# Patient Record
Sex: FEMALE | Race: WHITE | NOT HISPANIC OR LATINO | URBAN - METROPOLITAN AREA
[De-identification: names, ages, dates, MRNs, and addresses within clinical notes are randomized per-mention and may not be internally consistent; named-entity substitution may affect disease eponyms.]

---

## 2020-05-11 ENCOUNTER — OFFICE (OUTPATIENT)
Dept: URBAN - METROPOLITAN AREA CLINIC 11 | Facility: CLINIC | Age: 25
End: 2020-05-11

## 2020-05-11 VITALS
HEART RATE: 76 BPM | WEIGHT: 156 LBS | HEIGHT: 68 IN | SYSTOLIC BLOOD PRESSURE: 150 MMHG | DIASTOLIC BLOOD PRESSURE: 89 MMHG

## 2020-05-11 DIAGNOSIS — R11.0 NAUSEA: ICD-10-CM

## 2020-05-11 DIAGNOSIS — R53.83 OTHER FATIGUE: ICD-10-CM

## 2020-05-11 DIAGNOSIS — N92.0 EXCESSIVE AND FREQUENT MENSTRUATION WITH REGULAR CYCLE: ICD-10-CM

## 2020-05-11 DIAGNOSIS — R50.9 FEVER, UNSPECIFIED: ICD-10-CM

## 2020-05-11 LAB
FE+TIBC+FER: FERRITIN, SERUM: 10 NG/ML — LOW (ref 15–150)
FE+TIBC+FER: IRON BIND.CAP.(TIBC): 403 UG/DL (ref 250–450)
FE+TIBC+FER: IRON SATURATION: 10 % — LOW (ref 15–55)
FE+TIBC+FER: IRON: 41 UG/DL (ref 27–159)
FE+TIBC+FER: UIBC: 362 UG/DL (ref 131–425)
H PYLORI BREATH TEST: NEGATIVE
H. PYLORI BREATH COLLECTION: (no result)

## 2020-05-11 PROCEDURE — 99204 OFFICE O/P NEW MOD 45 MIN: CPT | Performed by: INTERNAL MEDICINE

## 2020-05-11 RX ORDER — LANSOPRAZOLE 15 MG/1
15 CAPSULE, DELAYED RELEASE ORAL
Qty: 30 | Refills: 3 | Status: ACTIVE
Start: 2020-05-11

## 2020-05-11 NOTE — SERVICENOTES
She is very concerned about her intermittent fever. She is non-toxic appearing. May need referral to ID. Will discuss with Dr. Miner.

## 2020-05-11 NOTE — SERVICEHPINOTES
Mrs. David is a very nice 24-year-old  female with a past medical history of gastritis diagnosed 11 years ago who presents with intermittent fever, fatigue and nausea.Gianluca has nausea most days.  It is often worse at night.  She has had no vomiting.  No significant abdominal  pain or change in her bowel habits.  She reports feeling febrile up to 99.8-100.2 on a near daily basis over the last 2-3 months. She has been alternating Tylenol and ibuprofen.  She has not had any cough, dysuria, hematuria etc.   She has felt significant fatigue.  She has occasionally soak the she eats at night which she relates to her fever breaking.  No recent travel, sick contacts, tick bites etc. BR she reports her have periods are quite heavy.  She reports she has a very sensitive stomach and cannot tolerate medicines.  She tried birth control, but she was unable to tolerate.Gianluca had upper endoscopy at age 13 was diagnosed with gastritis.  She took Prevacid for short period time with great improvement.